# Patient Record
(demographics unavailable — no encounter records)

---

## 2019-06-04 NOTE — REP
LEFT ELBOW, FOUR VIEWS:

 

HISTORY: Fall.

 

There is no acute fracture or dislocation. The joint space is normal in

appearance.

 

IMPRESSION:There is no acute fracture or dislocation.

 

 

Electronically Signed by

Aman Richardson MD 06/04/2019 06:53 P